# Patient Record
(demographics unavailable — no encounter records)

---

## 2025-02-13 NOTE — HISTORY OF PRESENT ILLNESS
[FreeTextEntry1] : 68 yo man presents for f/u cardiology evaluation of CAD.  HLD, on statin, Vascepa no h/o HTN, DM, smoking no h/o heart disease no h/o CVA, PVD carotid artery dissection thought secondary to aggressive neck stretching; no interventions (~2016); no repercussions no DM; no smoking; wine  (red wine)  Family history: no obvious FH of early CAD or SCD irritable bowel syndrome / GERD (has not tolerated low dose, stomach-coated aspirin in the past  Active; frequent exercise with ; mild weights, cardio.  Diet is relatively healthy. ~2 glasses of red wine per night.  CTA and MRI (not for symptoms, just for a "check-up"): 8/1/2023 Coronary artery calcium score 272 (0=left main; 262=LAD; 10=LCX; 0=RCA) Right dominant circulation calcified plaque in pLAD, 40-50% stenosis 20% LCX stenosis no significant RCA lesions  tricuspid aortic valve; LVEF 60% carotid / vertebral arteries with no stenosis normal thoracic aorta and pulmonary arteries  Labs, August 2023: CBC normal A1c 4.9% tchol 178, trig 97, HDL 62, LDL 98 mg/dL Lp (a) normal CMP normal TSH normal  Labs Dec 2023: tchol 161, trig 170, HDL 49, LDL 83 mg/dL CMP normal  At his last visit with me in August 2024, the following issues were discussed: Borderline hypertension (796.2) (R03.0) BP well-controlled on exam today and at home. Encourage home BP monitoring and     educated about proper methods to check / record home BP. For now, no medications.     Low salt diet, exercise, limited alcohol. Never has required BP medications. CAD (coronary artery disease) (414.00) (I25.10) Asymptomatic from cardiovascular standpoint. He had a CAC/CTA with evidence for LAD     calcification and ~40-50% proximal stenosis. We continued our discussion about CAD     and the implications of calcium and mild/moderate stenosis. There is no indication for     further (invasive) testing. Rather, further focus on prevention is warranted. We reviewed     healthy lifestyle tips, most of which he is already doing. He is able to     tolerate aspirin every other day, which is a fine regimen for him. Will recheck lipid levels;     prefer he stay on Vascepa. Hyperlipidemia (272.4) (E78.5) Will check lipid levels to help optimize levels.  Labs August 2024: tchol 150, trig 188, HDL 49, LDL 69 mg/dL CMP normal  Since last visit, doing well. Remains very active.  Treadmill, at least one mile per day. No symptoms at rest or with exertion.  HR max up to 130's mmHg during exertion.  No SOB/ALEJANDRO, palpitations, edema. Weight stable. Diet is stable, although eats out a bit. Highly milk allergic; doesn't eat sweets BP at home in good range  EKG today: SR, RBBB, LAE

## 2025-02-13 NOTE — PHYSICAL EXAM

## 2025-02-13 NOTE — PHYSICAL EXAM
